# Patient Record
Sex: FEMALE | Race: BLACK OR AFRICAN AMERICAN
[De-identification: names, ages, dates, MRNs, and addresses within clinical notes are randomized per-mention and may not be internally consistent; named-entity substitution may affect disease eponyms.]

---

## 2023-04-10 ENCOUNTER — HOSPITAL ENCOUNTER (EMERGENCY)
Dept: HOSPITAL 12 - ER | Age: 38
Discharge: HOME | End: 2023-04-10
Payer: MEDICAID

## 2023-04-10 VITALS — WEIGHT: 137 LBS | BODY MASS INDEX: 21.5 KG/M2 | HEIGHT: 67 IN

## 2023-04-10 VITALS — DIASTOLIC BLOOD PRESSURE: 85 MMHG | SYSTOLIC BLOOD PRESSURE: 120 MMHG

## 2023-04-10 DIAGNOSIS — Z88.2: ICD-10-CM

## 2023-04-10 DIAGNOSIS — S20.229A: ICD-10-CM

## 2023-04-10 DIAGNOSIS — S30.0XXA: ICD-10-CM

## 2023-04-10 DIAGNOSIS — S13.4XXA: Primary | ICD-10-CM

## 2023-04-10 DIAGNOSIS — Z88.8: ICD-10-CM

## 2023-04-10 DIAGNOSIS — Z79.899: ICD-10-CM
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
## 2023-04-10 DIAGNOSIS — R10.2: ICD-10-CM
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
## 2023-04-10 PROCEDURE — 99285 EMERGENCY DEPT VISIT HI MDM: CPT

## 2023-04-10 PROCEDURE — 72125 CT NECK SPINE W/O DYE: CPT

## 2023-04-10 PROCEDURE — 72131 CT LUMBAR SPINE W/O DYE: CPT

## 2023-04-10 PROCEDURE — 36415 COLL VENOUS BLD VENIPUNCTURE: CPT

## 2023-04-10 PROCEDURE — 84702 CHORIONIC GONADOTROPIN TEST: CPT

## 2023-04-10 PROCEDURE — 72128 CT CHEST SPINE W/O DYE: CPT

## 2023-04-10 PROCEDURE — A4663 DIALYSIS BLOOD PRESSURE CUFF: HCPCS

## 2023-04-10 PROCEDURE — 96372 THER/PROPH/DIAG INJ SC/IM: CPT

## 2023-04-10 NOTE — NUR
Patient discharged to home in stable condition.  Written and verbal after care 
instructions given. 

Patient verbalizes understanding of instructions. Stressed follow up or return 
to ER for worsening s/s.


-------------------------------------------------------------------------------

Addendum: 04/10/23 at 2334 by ALEX

-------------------------------------------------------------------------------

Instructed patient not to drive.